# Patient Record
Sex: FEMALE | Race: WHITE | NOT HISPANIC OR LATINO | Employment: OTHER | URBAN - METROPOLITAN AREA
[De-identification: names, ages, dates, MRNs, and addresses within clinical notes are randomized per-mention and may not be internally consistent; named-entity substitution may affect disease eponyms.]

---

## 2022-02-25 ENCOUNTER — THERAPY VISIT (OUTPATIENT)
Dept: PHYSICAL THERAPY | Facility: CLINIC | Age: 72
End: 2022-02-25
Payer: COMMERCIAL

## 2022-02-25 DIAGNOSIS — S46.011D TRAUMATIC COMPLETE TEAR OF RIGHT ROTATOR CUFF, SUBSEQUENT ENCOUNTER: Primary | ICD-10-CM

## 2022-02-25 PROCEDURE — 97161 PT EVAL LOW COMPLEX 20 MIN: CPT | Mod: GP | Performed by: PHYSICAL THERAPIST

## 2022-02-25 PROCEDURE — 97110 THERAPEUTIC EXERCISES: CPT | Mod: GP | Performed by: PHYSICAL THERAPIST

## 2022-02-25 NOTE — PROGRESS NOTES
Physical Therapy Initial Examination/Evaluation  February 25, 2022    Sandra Vergara is a 71 year old female referred to physical therapy by Kayden Choe MD for treatment of R shoulder RCT massive anterosuperior RCT, possible acute biceps tendon tear with Precautions/Restrictions/MD instructions E&T    Therapist Impression:   Sandra presents to physical therapy with symptoms consistent with the above diagnosis.  We initiated AAROM exercises for home.    GOALS: playing with [employer]    Subjective:  DOI/onset: 2/11/2022 DOS: none  Acute Injury or Gradual Onset?: Acute injury onset  Mechanism of Injury: Fall down stairs  Related PMH: None Previous Treatment: Tylenol Effect of prior treatment: fair  Imaging: MRI, x-tray  Chief Complaint/Functional Limitations:   Raching and see below in therapy evaluation codes   Pain: rest - /10, activity 9/10 Location: biceps Frequency: Intermittent Described as: sharp Alleviated by: Rest and Tylenol Progression of Symptoms: Gradually getting better. Time of day when pain is worse: Activity related  Sleeping: Interrupted but not due to issue being seen for   Occupation: retired  Job duties: playing with [employer]  Current HEP/exercise regimen: None  Patient's goals are see chief complaints     Other pertinent PMH/Red Flags: High Blood Pressure   Barriers at home/work: None as reported by patient  Pertinent Surgical History: None  Medications: High blood pressure  General health as reported by patient: good  Return to MD:  prn      SHOULDER EXAMINATION    STATIC POSTURE  Forward head: mild   Rounded shoulders:mild  Shoulder internally rotated: mild     SHOULDER RANGE OF MOTION  AROM Flexion Abduction ER Base Ext/IR or hand behind back angle   Left wnl na  Ant Drift: na wnl T8   Right 60 na  Ant Drift: na wnl R glute   Pain: yes; T2 ANCELMO      PROM Flexion Abd 90-90 ER 90-90 IR Scap Stabilized Horizontal Adduction   Left wnl wnl  90 46 na   Right 95 PROM, 125 AAROM table 90  netural na na   GH indicates pure glenohumeral ROM    SHOULDER STRENGTH  Biceps:  Strong and pain-free    SPECIAL TESTS     PALPATION  Left: Not assessed  Right: Mild tenderness to palpation at bicep tendon    Assessment/Plan:  Patient is a 71 year old female with right side shoulder complaints.    Patient has the following significant findings with corresponding treatment plan.                Diagnosis 1:  R shoulder RCT  Pain -  hot/cold therapy, manual therapy, splint/taping/bracing/orthotics, self management, education and home program  Decreased ROM/flexibility - manual therapy and therapeutic exercise  Decreased strength - therapeutic exercise and therapeutic activities  Impaired muscle performance - neuro re-education  Impaired posture - neuro re-education    Therapy Evaluation Codes:   Cumulative Therapy Evaluation is: Low complexity.    Previous and current functional limitations:  (See Goal Flow Sheet for this information)    Short term and Long term goals: (See Goal Flow Sheet for this information)     Communication ability:  Patient appears to be able to clearly communicate and understand verbal and written communication and follow directions correctly.  Treatment Explanation - The following has been discussed with the patient:   RX ordered/plan of care  Anticipated outcomes  Possible risks and side effects  This patient would benefit from PT intervention to resume normal activities.   Rehab potential is good.    Frequency:  2 X a month, once daily  Duration:  for 2 months  Discharge Plan:  Achieve all LTG.  Independent in home treatment program.  Reach maximal therapeutic benefit.    Please refer to the daily flowsheet for treatment today, total treatment time and time spent performing 1:1 timed codes.

## 2022-02-25 NOTE — PROGRESS NOTES
[facility]    OUTPATIENT Physical Therapy ORTHOPEDIC EVALUATION  PLAN OF TREATMENT FOR OUTPATIENT REHABILITATION  (COMPLETE FOR INITIAL CLAIMS ONLY)  Patient's Last Name, First Name, M.I.  YOB: 1950  Sandra Vergara    Provider s Name:  [facility]   Medical Record No.  [id number]   Start of Care Date:  02/25/22   Onset Date:   02/11/22   Type:     _X__PT   ___OT Medical Diagnosis:    Encounter Diagnosis   Name Primary?     Traumatic complete tear of right rotator cuff, subsequent encounter Yes        Treatment Diagnosis:  R shoulder RCT        Goals:     02/25/22 0500   Body Part   Goals listed below are for R shoulder RCT   Goal #1   Goal #1 reaching   Previous Functional Level No restrictions   Current Functional Level Can reach    Performance level 60 deg   STG Target Performance Reach    Performance level 90 deg   Rationale for independent personal hygiene;for dressing;for bathing;for meal preparation   Due date 03/18/22   LTG Target Performance Reach;overhead   Performance Level 120 deg   Rationale for independent personal hygiene;for dressing;for bathing;for meal preparation   Due date 04/22/22       Therapy Frequency:  2 x month  Predicted Duration of Therapy Intervention:  2 months    Reginald Rossi, SONAL                 I CERTIFY THE NEED FOR THESE SERVICES FURNISHED UNDER        THIS PLAN OF TREATMENT AND WHILE UNDER MY CARE .             Physician Signature               Date    X_____________________________________________________                             Certification Date From:  02/25/22   Certification Date To:  05/25/22    Referring Provider:  Kayden Choe    Initial Assessment        See Epic Evaluation SOC Date: 02/25/22

## 2022-03-04 ENCOUNTER — THERAPY VISIT (OUTPATIENT)
Dept: PHYSICAL THERAPY | Facility: CLINIC | Age: 72
End: 2022-03-04
Payer: COMMERCIAL

## 2022-03-04 DIAGNOSIS — S46.011D TRAUMATIC COMPLETE TEAR OF RIGHT ROTATOR CUFF, SUBSEQUENT ENCOUNTER: ICD-10-CM

## 2022-03-04 PROCEDURE — 97530 THERAPEUTIC ACTIVITIES: CPT | Mod: GP | Performed by: PHYSICAL THERAPIST

## 2022-03-04 PROCEDURE — 97110 THERAPEUTIC EXERCISES: CPT | Mod: GP | Performed by: PHYSICAL THERAPIST

## 2022-03-04 NOTE — PROGRESS NOTES
Therapist Impression:   Progressed to isotonics as tolerated    GOALS: Reaching    NEXT: supine/incline progression    PTRX: handouts    Subjective:  No new issues or concerns.  Reports can lay on pillow.      Objective:    SHOULDER RANGE OF MOTION  AROM Flexion Abduction ER Base Ext/IR or hand behind back angle   Left wnl na  Ant Drift: na wnl T8   Right 60 45  Ant Drift: na wnl R glute   Pain: yes; T2 ANCELMO      PROM Flexion Abd 90-90 ER 90-90 IR Scap Stabilized Horizontal Adduction   Left wnl wnl  90 46 na   Right 95 PROM, 125 AAROM table 110 65 na na   GH indicates pure glenohumeral ROM

## 2022-03-18 ENCOUNTER — THERAPY VISIT (OUTPATIENT)
Dept: PHYSICAL THERAPY | Facility: CLINIC | Age: 72
End: 2022-03-18
Payer: COMMERCIAL

## 2022-03-18 DIAGNOSIS — S46.011D TRAUMATIC COMPLETE TEAR OF RIGHT ROTATOR CUFF, SUBSEQUENT ENCOUNTER: ICD-10-CM

## 2022-03-18 PROCEDURE — 97530 THERAPEUTIC ACTIVITIES: CPT | Mod: GP | Performed by: PHYSICAL THERAPIST

## 2022-03-18 PROCEDURE — 97110 THERAPEUTIC EXERCISES: CPT | Mod: GP | Performed by: PHYSICAL THERAPIST

## 2022-03-18 NOTE — PROGRESS NOTES
Therapist Impression:   Positive progress with flexion and abduction ROM.  Continue current POC    GOALS: Reaching    NEXT: supine/incline progression    PTRX: handouts    Subjective:  Saw MD.  Suggested surgery.  Does not necessarily.  Pain is a lot less.   Objective:    SHOULDER RANGE OF MOTION  AROM Flexion Abduction ER Base Ext/IR or hand behind back angle   Left wnl na  Ant Drift: na 79 T8   Right 74 64  Ant Drift: na 70 R glute   Pain: yes; T2 ANCELMO      PROM Flexion Abd 90-90 ER 90-90 IR Scap Stabilized Horizontal Adduction   Left wnl wnl  90 46 na   Right 95 PROM, 125 AAROM table 110 65 na na   GH indicates pure glenohumeral ROM

## 2022-04-08 ENCOUNTER — THERAPY VISIT (OUTPATIENT)
Dept: PHYSICAL THERAPY | Facility: CLINIC | Age: 72
End: 2022-04-08
Payer: COMMERCIAL

## 2022-04-08 DIAGNOSIS — S46.011D TRAUMATIC COMPLETE TEAR OF RIGHT ROTATOR CUFF, SUBSEQUENT ENCOUNTER: ICD-10-CM

## 2022-04-08 PROCEDURE — 97110 THERAPEUTIC EXERCISES: CPT | Mod: GP | Performed by: PHYSICAL THERAPIST

## 2022-04-08 PROCEDURE — 97530 THERAPEUTIC ACTIVITIES: CPT | Mod: GP | Performed by: PHYSICAL THERAPIST

## 2022-04-08 NOTE — PROGRESS NOTES
Therapist Impression:   Positive progress with flexion and ext/IR ROM.  Decrease in abduction ROM.  Added in sidelying abd and incline pendulums    GOALS: Reaching    NEXT: supine/incline progression    PTRX: handouts    Subjective:  Pain has been gone after taking medicine for a sinus infection.    Objective:    SHOULDER RANGE OF MOTION  AROM Flexion Abduction ER Base Ext/IR or hand behind back angle   Left wnl na  Ant Drift: na 79 T8   Right 103 50  Ant Drift: na 70 L1   Pain: yes; T2 ANCELMO      PROM Flexion Abd 90-90 ER 90-90 IR Scap Stabilized Horizontal Adduction   Left wnl wnl  90 46 na   Right 95 PROM, 125 AAROM table 110 70 na na   GH indicates pure glenohumeral ROM

## 2022-04-25 ENCOUNTER — THERAPY VISIT (OUTPATIENT)
Dept: PHYSICAL THERAPY | Facility: CLINIC | Age: 72
End: 2022-04-25
Payer: COMMERCIAL

## 2022-04-25 DIAGNOSIS — S46.011D TRAUMATIC COMPLETE TEAR OF RIGHT ROTATOR CUFF, SUBSEQUENT ENCOUNTER: Primary | ICD-10-CM

## 2022-04-25 PROCEDURE — 97110 THERAPEUTIC EXERCISES: CPT | Mod: GP | Performed by: PHYSICAL THERAPIST

## 2022-04-25 PROCEDURE — 97530 THERAPEUTIC ACTIVITIES: CPT | Mod: GP | Performed by: PHYSICAL THERAPIST

## 2022-04-25 NOTE — PROGRESS NOTES
PROGRESS REPORT    Sandra has been in therapy from February 25, 2022 to Apr 25, 2022 for treatment of R shoulder RCT.    Therapist Impression:  Abduction and ER improved.  No change in flexion or ext/IR.  Progressed to pulleys and progressed behind back stretching as able.  Introduced a few basic gym exercises as well.    GOALS: Reaching    NEXT: supine/incline progression    PTRX: handouts    Subjective:  Reports not having much pain.  MD did not suggest surgery at this stage.    Objective:    SHOULDER RANGE OF MOTION  AROM Flexion Abduction ER Base Ext/IR or hand behind back angle   Left wnl na  Ant Drift: na 79 T8   Right 100 60  Ant Drift: na 70 L1   Pain: mild      PROM Flexion Abd 90-90 ER 90-90 IR Scap Stabilized Horizontal Adduction   Left wnl wnl  90 46 na   Right 140 110 70 30 na   GH indicates pure glenohumeral ROM      ASSESSMENT/PLAN  Updated problem list and treatment plan: The encounter diagnosis was Traumatic complete tear of right rotator cuff, subsequent encounter. Pain - HEP  Decreased ROM/flexibility - HEP  Decreased function - HEP  Decreased strength - HEP  STG/LTGs have been met or progress has been made towards goals:  None  Assessment of Progress: The patient's condition is improving.  Self Management Plans:  Patient has been instructed in a home treatment program.  I have re-evaluated this patient and find that the nature, scope, duration and intensity of the therapy is appropriate for the medical condition of the patient.  Sandra continues to require the following intervention to meet STG and LTG's:  PT    Recommendations:  This patient would benefit from continued therapy.     Frequency:  1 X a month, once daily  Duration:  for 3 months              Please refer to the daily flowsheet for treatment today, total treatment time and time spent performing 1:1 timed codes.

## 2023-04-21 PROBLEM — S46.011D TRAUMATIC COMPLETE TEAR OF RIGHT ROTATOR CUFF, SUBSEQUENT ENCOUNTER: Status: RESOLVED | Noted: 2022-02-25 | Resolved: 2023-04-21
